# Patient Record
Sex: FEMALE | Race: BLACK OR AFRICAN AMERICAN | Employment: UNEMPLOYED | URBAN - METROPOLITAN AREA
[De-identification: names, ages, dates, MRNs, and addresses within clinical notes are randomized per-mention and may not be internally consistent; named-entity substitution may affect disease eponyms.]

---

## 2023-10-19 ENCOUNTER — TELEPHONE (OUTPATIENT)
Age: 33
End: 2023-10-19

## 2024-03-21 NOTE — PROGRESS NOTES
Patient: Stacey Yuen                MRN: [id number]       SSN: [id number]  YOB: 1990        AGE: 34 y.o.        SEX: female      PCP: Jean Claude Zaidi MD  03/25/24    Chief Complaint   Patient presents with    Leg Pain     left     HISTORY:  Stacey Yuen is a 34 y.o. female who is seen for 1 week history of left leg, thigh, and back pain. She denies any recent injury.  She felt a sudden pain in her left thigh when she kneeled to pick something up. She experiences spasms in her left thigh that radiate down to he rleft knee. She feels pain with standing, walking and stair climbing.  She experiences startup pain after sitting. She has seen a chiropractor for back manipulations.     She was previously seen by Dr. Nance for back and bilateral leg pain.     She was previously seen for right knee pain.  She states she felt sudden pain when she ran to keep her little niece from running into the street. She felt a sudden pain as she reached for her niece.  Since then she has been experiencing pain in her lower back and right knee.      She was previously seen for right hip pain related to AVN/SS disease.  She does not recall any injury.       She has a h/o Bell's palsy.       She has a h/o sickle cell disease. She had a cervical infection and bled for a year following cervix surgery. She did not have a hysterectomy. She was told she may have to have a blood transfusion.        She has a h/o right peroneal vein DVT.    Occupation, etc:  Ms. Chambers is applying for Social Security disability benefits for her chronic hip pain but it is currently under review after being denied twice. 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 She hasn't worked during the  past year due to hip and knee pain. She was living with her parents in [place] but is currently living in her car. Her parents would not allow her to live with them after she was recently hospitalized in January. She had to

## 2024-03-25 ENCOUNTER — OFFICE VISIT (OUTPATIENT)
Age: 34
End: 2024-03-25
Payer: COMMERCIAL

## 2024-03-25 VITALS — BODY MASS INDEX: 35.92 KG/M2 | TEMPERATURE: 98.6 F | WEIGHT: 237 LBS | HEIGHT: 68 IN

## 2024-03-25 DIAGNOSIS — M79.10 MYALGIA: ICD-10-CM

## 2024-03-25 DIAGNOSIS — M54.50 LUMBAR PAIN: ICD-10-CM

## 2024-03-25 DIAGNOSIS — M25.562 ACUTE PAIN OF LEFT KNEE: Primary | ICD-10-CM

## 2024-03-25 PROCEDURE — 99213 OFFICE O/P EST LOW 20 MIN: CPT | Performed by: SPECIALIST

## 2024-03-25 PROCEDURE — 73562 X-RAY EXAM OF KNEE 3: CPT | Performed by: SPECIALIST

## 2024-03-25 PROCEDURE — 20552 NJX 1/MLT TRIGGER POINT 1/2: CPT | Performed by: SPECIALIST

## 2024-03-25 RX ORDER — BETAMETHASONE SODIUM PHOSPHATE AND BETAMETHASONE ACETATE 3; 3 MG/ML; MG/ML
3 INJECTION, SUSPENSION INTRA-ARTICULAR; INTRALESIONAL; INTRAMUSCULAR; SOFT TISSUE ONCE
Status: COMPLETED | OUTPATIENT
Start: 2024-03-25 | End: 2024-03-25

## 2024-03-25 RX ADMIN — BETAMETHASONE SODIUM PHOSPHATE AND BETAMETHASONE ACETATE 3 MG: 3; 3 INJECTION, SUSPENSION INTRA-ARTICULAR; INTRALESIONAL; INTRAMUSCULAR; SOFT TISSUE at 13:55

## 2024-04-04 ENCOUNTER — HOSPITAL ENCOUNTER (OUTPATIENT)
Facility: HOSPITAL | Age: 34
Setting detail: RECURRING SERIES
Discharge: HOME | End: 2024-04-07
Payer: COMMERCIAL

## 2024-04-04 PROCEDURE — 97163 PT EVAL HIGH COMPLEX 45 MIN: CPT

## 2024-04-04 NOTE — PROGRESS NOTES
PT DAILY TREATMENT NOTE/LUMBAR EVAL    Patient Name: Stacey Yuen    Date: 2024    : 1990  Insurance: Payor: [employer] [place] / Plan: [employer] [place] / Product Type: *No Product type* /      Patient  verified yes     Visit #   Current / Total 1 8-16   Time   In / Out [id number] 950   Pain   In / Out 10 10C   Subjective Functional Status/Changes: See Below     Treatment Area: Other low back pain [M54.59]    SUBJECTIVE    [x]constant [x]intermittent []improving []worsening []no change since onset    Mechanism of Injury: Last month was getting out of the car to get something when she felt her left LE spasm and fell to the ground. Could not get back up off the ground, had to call the ambulance.   Reports L/S symptoms had been bothering her since 2023, but has since worsened after her fall. Denies radiating pain to her left LE during that time period prior to the fall.    Current symptoms/Complaints: LBP with radiating pain through lateral aspect of her left thigh to the knee. Unable to stand/walk without RW.     PLOF: was working at [employer]    Limitations to PLOF: unable to return to work.    Symptoms:  Aggravated by:   [x] Bending [x] Sitting [x] Standing [x] Walking   [] Moving [] Cough [] Sneeze [] Valsalva   [] AM  [] PM  Lying:  [] sup   [] pro   [] sidelying   [] Other: long static positions     Eased by:    [] Bending [] Sitting [] Standing [] Walking   [] Moving [] AM  [] PM  Lying: [] sup  [] pro  [x] Sidelying on left   [] Other:    Continued use makes the pain:  [] Better []  Worse []  No effect     Pain at rest: [] No    [] Yes       Previous Treatment/Compliance: Aquatherapy but unable to recall when    PMHx/Surgical Hx: Reports has been using RW for 6 months because her right side gave out on her while she was visiting her brother in the hospital. Patient was ruled out for CVA. Was told that she had a pinched nerve in her neck due to disc herniation that caused

## 2024-04-04 NOTE — PROGRESS NOTES
[facility]  [address] [place] [address] Phone: [phone] Fax   Plan of Care / Statement of Necessity for Physical Therapy Services     Patient Name: Stacey Yuen : 1990   Medical   Diagnosis: Other low back pain [M54.59] Treatment Diagnosis: M54.59  OTHER LOWER BACK PAIN and M54.32  SCIATICA, LEFT SIDE   Onset Date: 2024 Payor: Payor: [employer] [place] / Plan: [employer] [place] / Product Type: *No Product type* /    Referral Source: Ru Zamora MD Start of Care (SOC): 2024   Prior Hospitalization: See medical history Provider #: [id number]   Prior Level of Function: Chronic pain, seeking disability   Comorbidities: Cervical stenosis, hx aphasia, hx seizures, HTN, sickle cell, hx DVT, schizophrenia, severe anxiety, depression, hx B knee pain, hx chronic LBP   Medications: Verified on Patient Summary List     Assessment / key information:  Patient is a 34 y.o. female presenting with CC left LE pain which presented after exiting her car and falling. She reports fall due to her left LE losing control and function, resulting in having to call 911. Patient had a recent hx of right sided weakness and aphasia. EMR indicates C/S stenosis likely cause for RUE/LE symptoms, but unable to account for aphasia. LQS unremarkable. L/S ROM limited in all planes except flexion. Patient reports reliance on RW since October due to right sided weakness, but had previously utilized SPC for mobility due to \"deterioration of the whole right side of the body\" for ~2 years. Patient demonstrates (+) ASLR and Slump tests. She is able to centralize left LE symptoms via MDT repeated movements. Pt  will benefit from physical therapist management to address her impairments (listed below),  educate her, and improve her level of function. Thanks for your referral.    Evaluation Complexity:  History:  HIGH

## 2024-04-10 ENCOUNTER — HOSPITAL ENCOUNTER (OUTPATIENT)
Facility: HOSPITAL | Age: 34
Setting detail: RECURRING SERIES
Discharge: HOME | End: 2024-04-13
Payer: COMMERCIAL

## 2024-04-10 PROCEDURE — 97535 SELF CARE MNGMENT TRAINING: CPT

## 2024-04-10 NOTE — PROGRESS NOTES
PHYSICAL / OCCUPATIONAL THERAPY - DAILY TREATMENT NOTE    Patient Name: Stacey Yuen    Date: 4/10/2024    : 1990  Insurance: Payor: [employer] [place] / Plan: [employer] [place] / Product Type: *No Product type* /      Patient  verified Yes     Visit #   Current / Total 2 8-16   Time   In / Out 840 900   Pain   In / Out 8 8   Subjective Functional Status/Changes: Patient reports \"uncontrollable bowels\" over the last few days and states that her back pain has increased 2/2 this. She reports increased nausea but denies fever and endorses desire to complete PT today.  Patient states that she experiences significant sternal/chest discomfort (\"tightening and SOB\") when she reaches her arms up while yawning or when she is only yawning. Patient also endorses concern that her right shoulder dislocated 2-3 days ago. She has an MD appt scheduled tomorrow.      TREATMENT AREA =  Other low back pain [M54.59]    OBJECTIVE         Therapeutic Procedures:    Tx Min Billable or 1:1 Min (if diff from Tx Min) Procedure, Rationale, Specifics   20 20 88169 Self Care/Home Management (timed):  improve patient knowledge and understanding of positioning, diagnosis/prognosis, and S/S of shoulder dislocation  to improve patient's ability to progress to PLOF and address remaining functional goals.  (see flow sheet as applicable)     Details if applicable:       x x x   x x x   x x x   x x x   20 20 [facility] Totals Reminder: bill using total billable min of TIMED therapeutic procedures (example: do not include dry needle or estim unattended, both untimed codes, in totals to left)  8-22 min = 1 unit; 23-37 min = 2 units; 38-52 min = 3 units; 53-67 min = 4 units; 68-82 min = 5 units   Total Total     [x]  Patient Education billed concurrently with other procedures   [x] Review HEP    [] Progressed/Changed HEP, detail:    [] Other detail:       Objective Information/Functional Measures/Assessment    PT held today

## 2024-04-16 ENCOUNTER — APPOINTMENT (OUTPATIENT)
Facility: HOSPITAL | Age: 34
End: 2024-04-16
Payer: COMMERCIAL

## 2024-04-19 ENCOUNTER — APPOINTMENT (OUTPATIENT)
Facility: HOSPITAL | Age: 34
End: 2024-04-19
Payer: COMMERCIAL

## 2024-04-22 ENCOUNTER — APPOINTMENT (OUTPATIENT)
Facility: HOSPITAL | Age: 34
End: 2024-04-22
Payer: COMMERCIAL

## 2024-04-24 ENCOUNTER — OFFICE VISIT (OUTPATIENT)
Age: 34
End: 2024-04-24
Payer: COMMERCIAL

## 2024-04-24 VITALS — BODY MASS INDEX: 37.56 KG/M2 | WEIGHT: 247 LBS

## 2024-04-24 DIAGNOSIS — M99.04 SACRAL REGION SOMATIC DYSFUNCTION: ICD-10-CM

## 2024-04-24 DIAGNOSIS — M50.30 DDD (DEGENERATIVE DISC DISEASE), CERVICAL: ICD-10-CM

## 2024-04-24 DIAGNOSIS — M99.02 THORACIC REGION SOMATIC DYSFUNCTION: ICD-10-CM

## 2024-04-24 DIAGNOSIS — M99.08 RIB CAGE REGION SOMATIC DYSFUNCTION: ICD-10-CM

## 2024-04-24 DIAGNOSIS — M99.03 LUMBAR REGION SOMATIC DYSFUNCTION: ICD-10-CM

## 2024-04-24 DIAGNOSIS — M79.10 MYALGIA: ICD-10-CM

## 2024-04-24 DIAGNOSIS — M51.36 DDD (DEGENERATIVE DISC DISEASE), LUMBAR: Primary | ICD-10-CM

## 2024-04-24 DIAGNOSIS — M99.05 PELVIC SOMATIC DYSFUNCTION: ICD-10-CM

## 2024-04-24 DIAGNOSIS — M99.07 UPPER EXTREMITY SOMATIC DYSFUNCTION: ICD-10-CM

## 2024-04-24 DIAGNOSIS — M99.01 CERVICAL SOMATIC DYSFUNCTION: ICD-10-CM

## 2024-04-24 DIAGNOSIS — M99.06 LOWER LIMB REGION SOMATIC DYSFUNCTION: ICD-10-CM

## 2024-04-24 DIAGNOSIS — M99.09 SOMATIC DYSFUNCTION OF ABDOMINAL REGION: ICD-10-CM

## 2024-04-24 PROCEDURE — 99214 OFFICE O/P EST MOD 30 MIN: CPT | Performed by: FAMILY MEDICINE

## 2024-04-24 PROCEDURE — 98929 OSTEOPATH MANJ 9-10 REGIONS: CPT | Performed by: FAMILY MEDICINE

## 2024-04-24 NOTE — PROGRESS NOTES
HISTORY OF PRESENT ILLNESS    Stacey Yuen 1990 is a 34 y.o. year old female comes in today to be evaluated and treated for: back pain chronic    Since last appt 4/19/2023 has noticed pain back chronic much worse since having to sleep in car the last 6 months. Pain level 10 - Worst pain ever/10. Using robaxin with benefit. Did have trigger point injections MAR2024. Did see neurologist Dr. Schaefer yesterday who referred Dr. Motta and started Lyrica. Ambulates with walker.    IMAGING: MRI lumbar 4/15/2024  1. Degenerative changes L5-S1 disc with extraforaminal focal disc bulge.. Mild to moderate foraminal stenosis L5-S1 on the left.     MRI cervical 4/15/2024  Severe spinal canal and bilateral foraminal narrowing at C5-C6, with additional degenerative changes in the cervical spine as discussed above. No spinal cord signal abnormality. Findings are similar to slightly progressed since October 2023.     XR right hip 1/17/2022 images reviewed and concur with:  Lower Lumbar Spine: No acute finding.   1. 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      XR right knee 1/23/2023  IMPRESSION:  Three views with bilateral knees on AP view - No fractures, no effusion, moderate joint space narrowing, osteophytes present. Kellgren Valente grade 2      Past Surgical History:   Procedure Laterality Date    OTHER SURGICAL HISTORY      abcess removed from pelvis     Social History     Socioeconomic History    Marital status: Single     Spouse name: None    Number of children: None    Years of education: None    Highest education level: None   Tobacco Use    Smoking status: Every Day    Smokeless tobacco: Never   Substance and Sexual Activity    Alcohol use: Yes     Social Determinants of Health     Physical Activity: Sufficiently Active (9/13/2022)    Exercise Vital Sign     Days of Exercise per Week: 7 days     Minutes of Exercise per Session: 60 min   Intimate Partner Violence: Not At Risk (9/13/2022)

## 2024-04-25 ENCOUNTER — APPOINTMENT (OUTPATIENT)
Facility: HOSPITAL | Age: 34
End: 2024-04-25
Payer: COMMERCIAL

## 2024-04-30 ENCOUNTER — APPOINTMENT (OUTPATIENT)
Facility: HOSPITAL | Age: 34
End: 2024-04-30
Payer: COMMERCIAL

## 2024-05-01 ENCOUNTER — HOSPITAL ENCOUNTER (OUTPATIENT)
Facility: HOSPITAL | Age: 34
Discharge: HOME | End: 2024-05-04
Payer: COMMERCIAL

## 2024-05-01 DIAGNOSIS — R56.9 SEIZURE-LIKE ACTIVITY (HCC): ICD-10-CM

## 2024-05-01 PROCEDURE — 95819 EEG AWAKE AND ASLEEP: CPT

## 2024-12-11 NOTE — PROGRESS NOTES
reflux >500 ms was identified in the right great saphenous vein at the saphenofemoral junction. The contralateral common femoral vein is patent with normal hemodynamics.   Conclusions: Acute, non occlusive deep venous  thrombosis in the right peroneal vein (1 of 2).   Venous valvular reflux in the right as described.   When compared to the previous bilateral exam performed on 4/24/2021, these are all new findings.      RADIOGRAPHS:  03/25/24  3 VIEWS LT KNEE KEVIN  Three views of left knee: no fractures, no effusion, Kellgren Valente grade 1 with mild joint space narrowing, no osteophytes present.     XR KNEE RT 3 V 01/23/2023 KEVIN  IMPRESSION:  Three views with bilateral knees on AP view - No fractures, no effusion, moderate joint space narrowing, osteophytes present. Kellgren Valente grade 2       XR HIP RT 2 V 1/17/2022 [facility] ED  IMPRESSION   1. 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    I have independently reviewed these images.    AP pelvis and two views of right hip: no fractures, joint space narrowing, + osteophytes present, increased density superior femoral head, findings consistent with bilateral AVN    Chart reviewed for the following:   Ru DE LEÓN MD, have reviewed the History, Physical and updated the Allergic reactions for Stacey Yuen     TIME OUT performed immediately prior to start of procedure:  Ru DE LEÓN MD, have performed the following reviews on Stacey Yuen prior to the start of the procedure:            * Patient was identified by name and date of birth   * Agreement on procedure being performed was verified  * Risks and Benefits explained to the patient  * Procedure site verified and marked as necessary  * Patient was positioned for comfort  * Consent was obtained  Time: 10:43 AM  Date of procedure: 12/13/2024    Procedure performed by:  Dr. Zamora    Ms. Reyes Yuen tolerated the procedure well with no complications.  PROCEDURE:

## 2024-12-13 ENCOUNTER — OFFICE VISIT (OUTPATIENT)
Age: 34
End: 2024-12-13

## 2024-12-13 VITALS — HEIGHT: 68 IN | WEIGHT: 235 LBS | BODY MASS INDEX: 35.61 KG/M2 | TEMPERATURE: 97.3 F

## 2024-12-13 DIAGNOSIS — G89.29 CHRONIC MIDLINE LOW BACK PAIN WITHOUT SCIATICA: ICD-10-CM

## 2024-12-13 DIAGNOSIS — M48.061 FORAMINAL STENOSIS OF LUMBAR REGION: ICD-10-CM

## 2024-12-13 DIAGNOSIS — M79.10 MYALGIA: Primary | ICD-10-CM

## 2024-12-13 DIAGNOSIS — M54.50 CHRONIC MIDLINE LOW BACK PAIN WITHOUT SCIATICA: ICD-10-CM

## 2024-12-13 RX ORDER — BETAMETHASONE SODIUM PHOSPHATE AND BETAMETHASONE ACETATE 3; 3 MG/ML; MG/ML
3 INJECTION, SUSPENSION INTRA-ARTICULAR; INTRALESIONAL; INTRAMUSCULAR; SOFT TISSUE ONCE
Status: COMPLETED | OUTPATIENT
Start: 2024-12-13 | End: 2024-12-13

## 2024-12-13 RX ADMIN — BETAMETHASONE SODIUM PHOSPHATE AND BETAMETHASONE ACETATE 3 MG: 3; 3 INJECTION, SUSPENSION INTRA-ARTICULAR; INTRALESIONAL; INTRAMUSCULAR; SOFT TISSUE at 10:34

## 2025-01-15 ENCOUNTER — TELEPHONE (OUTPATIENT)
Age: 35
End: 2025-01-15

## 2025-01-15 NOTE — TELEPHONE ENCOUNTER
1/15/25 Patient was called. She will schedule with spine. She will also wait to get an appointment with pain management. She will also take medication of the counter for her pain

## 2025-01-15 NOTE — TELEPHONE ENCOUNTER
Patient has called in with severe back pain, Patient stated that she threw her back out, sine the last appt the back pain has gotten worse and patient has requested something for pain and also a referral to Pain Management    Please advise patient @ [phone]

## 2025-01-16 NOTE — PROGRESS NOTES
Patient: Stacey Yuen                MRN: [id number]       SSN: [id number]  YOB: 1990        AGE: 34 y.o.        SEX: female      PCP: Jean Claude Zaidi MD  01/17/25    Chief Complaint   Patient presents with    Lower Back Pain     HISTORY:  Stacey Yuen is a 34 y.o. female who is seen for increased lower back pain. She responded to her right paralumbar muscle trigger injection last ov but her pains have returned. She was seen for lower back pain at [facility] emergency department 12/11/2024.  Lumbar CT scan revealed severe left and moderate right facet arthropathy with moderate bilateral neuroforaminal narrowing. She was given a Toradol injection and a prescription for Percocet. She has not been to the spine center. She underwent cervical discectomy on 5/6/24 by Dr. Barajas.  She experiences spasms in her right lower back that radiates down to her right thigh. She feels pain with standing, walking and stair climbing.  She experiences startup pain after sitting. She has seen a chiropractor for back manipulations.      She was previously seen by Dr. Nance for back and bilateral leg pain.      She was previously seen for right knee pain.  She states she felt sudden pain when she ran to keep her little niece from running into the street. She felt a sudden pain as she reached for her niece.  Since then she has been experiencing pain in her lower back and right knee.      She was previously seen for right hip pain related to AVN/SS disease.       She has a h/o Bell's palsy.       She has a h/o sickle cell disease. She had a cervical infection and bled for a year following cervix surgery. She did not have a hysterectomy. She was told she may have to have a blood transfusion.        She has a h/o right peroneal vein DVT.     Occupation, etc:  Ms. Chambers is applying for Social Security disability benefits for her chronic hip pain but it is currently under review after being

## 2025-01-17 ENCOUNTER — OFFICE VISIT (OUTPATIENT)
Age: 35
End: 2025-01-17

## 2025-01-17 DIAGNOSIS — M79.10 MYALGIA: Primary | ICD-10-CM

## 2025-01-17 DIAGNOSIS — M54.50 LUMBAR PAIN: ICD-10-CM

## 2025-01-17 DIAGNOSIS — M48.061 FORAMINAL STENOSIS OF LUMBAR REGION: ICD-10-CM

## 2025-01-17 DIAGNOSIS — M47.819 FACET ARTHROPATHY: ICD-10-CM

## 2025-01-17 RX ORDER — BETAMETHASONE SODIUM PHOSPHATE AND BETAMETHASONE ACETATE 3; 3 MG/ML; MG/ML
3 INJECTION, SUSPENSION INTRA-ARTICULAR; INTRALESIONAL; INTRAMUSCULAR; SOFT TISSUE ONCE
Status: COMPLETED | OUTPATIENT
Start: 2025-01-17 | End: 2025-01-17

## 2025-01-17 RX ADMIN — BETAMETHASONE SODIUM PHOSPHATE AND BETAMETHASONE ACETATE 3 MG: 3; 3 INJECTION, SUSPENSION INTRA-ARTICULAR; INTRALESIONAL; INTRAMUSCULAR; SOFT TISSUE at 16:12
